# Patient Record
Sex: FEMALE | Race: WHITE
[De-identification: names, ages, dates, MRNs, and addresses within clinical notes are randomized per-mention and may not be internally consistent; named-entity substitution may affect disease eponyms.]

---
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
## 2020-06-02 ENCOUNTER — HOSPITAL ENCOUNTER (EMERGENCY)
Dept: HOSPITAL 39 - ER | Age: 20
Discharge: HOME | End: 2020-06-02
Payer: SELF-PAY

## 2020-06-02 VITALS — DIASTOLIC BLOOD PRESSURE: 84 MMHG | OXYGEN SATURATION: 97 % | SYSTOLIC BLOOD PRESSURE: 118 MMHG

## 2020-06-02 VITALS — TEMPERATURE: 97.4 F

## 2020-06-02 DIAGNOSIS — N39.0: Primary | ICD-10-CM

## 2020-06-02 DIAGNOSIS — E66.9: ICD-10-CM

## 2020-06-02 PROCEDURE — 87086 URINE CULTURE/COLONY COUNT: CPT

## 2020-06-02 PROCEDURE — 81001 URINALYSIS AUTO W/SCOPE: CPT

## 2020-06-02 NOTE — ED.PDOC
History of Present Illness





- General


Chief Complaint:  Problem


Stated Complaint: burning with urination


Time Seen by Provider: 06/02/20 14:37


Source: patient


Exam Limitations: no limitations





- History of Present Illness


Initial Comments: 





4D DYSURIA, FREQUENCY.  TOOK AZO.  


NKMA. 


DENIES PREVIOUS UTI.  


Timing/Duration: constant


Quality: burning


Onset Location: suprapubic


Radiation: left flank


Activites at Onset: none


Prior abdominal problems: none


Improving Factors: nothing


Worsening Factors: nothing


Associated Symptoms: denies symptoms


Allergies/Adverse Reactions: 


Allergies





NO KNOWN ALLERGY Allergy (Verified 06/02/20 14:01)


   





Home Medications: 


Ambulatory Orders





Amphetamine-Dextroamphetamine [Adderall] 30 mg PO DAILY 10/16/16 


Naproxen [Naprosyn] 500 mg PO BID #20 tab 02/18/17 


Ondansetron [Zofran Odt] 4 mg PO Q8HRS PRN #20 tab 02/18/17 


Acetamin W/Cod #3 Tab [Tylenol w/CODEINE #3] 1 ea PO Q6HR PRN #40 tab 06/06/19 


Cefuroxime Axetil [Ceftin] 500 mg PO Q12H #20 tablet 06/06/19 


Sulfa/Trimeth 800/160 (Ds) Tab [Bactrim DS Tab] 1 unit PO BID #6 tab 06/02/20 











Review of Systems





- Review of Systems


Constitutional: States: no symptoms reported.  Denies: chills, fever


EENTM: States: no symptoms reported


Respiratory: States: no symptoms reported


Cardiology: States: no symptoms reported


Gastrointestinal/Abdominal: States: no symptoms reported


Genitourinary: States: dysuria, frequency, hematuria


Musculoskeletal: States: no symptoms reported


Skin: States: no symptoms reported


Neurological: States: no symptoms reported


Endocrine: States: no symptoms reported


Hematologic/Lymphatic: States: no symptoms reported


All other Systems: Reviewed and Negative





Past Medical History (General)





- Patient Medical History


Hx Seizures: No


Hx Stroke: No


Hx Dementia: No


Hx Asthma: No


Hx of COPD: No


Hx Cardiac Disorders: Yes


Hx Congestive Heart Failure: No


Hx Pacemaker: No


Hx Hypertension: No


Hx Thyroid Disease: No


Hx Diabetes: No


Hx Gastroesophageal Reflux: No


Hx Renal Disease: No


Hx Cancer: No


Hx of HIV: No


Hx Hepatitis C: No


Hx MRSA: No


Surgical History: no surgical history





- Vaccination History


Hx Tetanus, Diphtheria Vaccination: Yes


Hx Influenza Vaccination: No


Hx Pneumococcal Vaccination: No





- Social History


Hx Tobacco Use: No





- Female History


Patient Pregnant: No





Family Medical History





- Family History


  ** Mother


Family History: No Known


Living Status: Still Living


Hx Family Hypertension: Yes


Hx Family Diabetes: Yes





Physical Exam





- Physical Exam


General Appearance: Alert, Obese


Eyes, Ears, Nose, Throat Exam: PERRL/EOMI, normal ENT inspection


Neck: non-tender, full range of motion


Cardiovascular/Respiratory: regular rate, rhythm, no M/R/G


Gastrointestinal/Abdominal: normal bowel sounds, soft, no organomegaly, no 

pulsatile mass, other - POS SUPRAPUBIC TENDERNESS.  


Back Exam: normal inspection, CVA tenderness (L)


Extremity: normal range of motion, normal inspection


Neurologic: CNs II-XII nml as tested, no motor/sensory deficits


Skin Exam: normal color, warm/dry


Lymphatic: no adenopathy





Progress





- Progress


Progress: 





06/02/20 15:07


UA - POS LEUK EST, NITRITE, WBC, BACTERIA.  UTI.  ROCEPHIN AND PO BACTRIM.  

REFLEX TO CX.  





Departure





- Departure


Clinical Impression: 


 UTI (urinary tract infection)





Disposition: Discharge to Home or Self Care


Condition: Good


Departure Forms:  ED Discharge - Pt. Copy, Patient Portal Self Enrollment


Instructions:  DI for Urinary Tract Infection (UTI)


Diet: resume usual diet


Activity: increase activity as tolerated


Prescriptions: 


Sulfa/Trimeth 800/160 (Ds) Tab [Bactrim DS Tab] 1 unit PO BID #6 tab


Home Medications: 


Ambulatory Orders





Amphetamine-Dextroamphetamine [Adderall] 30 mg PO DAILY 10/16/16 


Naproxen [Naprosyn] 500 mg PO BID #20 tab 02/18/17 


Ondansetron [Zofran Odt] 4 mg PO Q8HRS PRN #20 tab 02/18/17 


Acetamin W/Cod #3 Tab [Tylenol w/CODEINE #3] 1 ea PO Q6HR PRN #40 tab 06/06/19 


Cefuroxime Axetil [Ceftin] 500 mg PO Q12H #20 tablet 06/06/19 


Sulfa/Trimeth 800/160 (Ds) Tab [Bactrim DS Tab] 1 unit PO BID #6 tab 06/02/20